# Patient Record
Sex: MALE | Race: WHITE | NOT HISPANIC OR LATINO
[De-identification: names, ages, dates, MRNs, and addresses within clinical notes are randomized per-mention and may not be internally consistent; named-entity substitution may affect disease eponyms.]

---

## 2022-06-30 PROBLEM — Z00.00 ENCOUNTER FOR PREVENTIVE HEALTH EXAMINATION: Status: ACTIVE | Noted: 2022-06-30

## 2022-07-07 ENCOUNTER — APPOINTMENT (OUTPATIENT)
Dept: ORTHOPEDIC SURGERY | Facility: CLINIC | Age: 27
End: 2022-07-07

## 2022-07-07 VITALS — WEIGHT: 170 LBS | HEIGHT: 69 IN | BODY MASS INDEX: 25.18 KG/M2

## 2022-07-07 DIAGNOSIS — S60.221A CONTUSION OF RIGHT HAND, INITIAL ENCOUNTER: ICD-10-CM

## 2022-07-07 PROCEDURE — 99203 OFFICE O/P NEW LOW 30 MIN: CPT | Mod: PA

## 2022-07-07 PROCEDURE — 99072 ADDL SUPL MATRL&STAF TM PHE: CPT

## 2022-07-07 NOTE — DATA REVIEWED
[FreeTextEntry1] :   An MRI of his right wrist brought in by the patient and scanned into the chart shows a contusion of the ulnar side a triquetrum and the 4th metacarpal.  Otherwise, there are no acute, displaced fractures or bony abnormalities.

## 2022-07-07 NOTE — DISCUSSION/SUMMARY
[de-identified] :   He will continue to ice and rest the area.\par I placed him in a cock-up wrist brace for immobilization and support.\par He understands these injuries can take 4-6 weeks to heal.  The 1st 2 weeks are typically the worst.\par He will continue to avoid any pushing, pulling, or heavy lifting.\par He will take over-the-counter anti-inflammatories for pain relief.\par He is temporarily moderately disabled and unable to return to work until his follow-up appointment.\par I will see him back in 4 weeks for repeat evaluation and hopefully return him to work full duty.\par All questions were answered today.

## 2022-07-07 NOTE — WORK
[Other: ___] : [unfilled] [Was the competent medical cause of the injury] : was the competent medical cause of the injury [Are consistent with the injury] : are consistent with the injury [Consistent with my objective findings] : consistent with my objective findings [Moderate Partial] : moderate partial [Does not reveal pre-existing condition(s) that may affect treatment/prognosis] : does not reveal pre-existing condition(s) that may affect treatment/prognosis [Cannot return to work because ________] : cannot return to work because [unfilled] [Patient] : patient [No Rx restrictions] : No Rx restrictions. [I provided the services listed above] :  I provided the services listed above. [FreeTextEntry1] :   Good [FreeTextEntry3] : TRAVIS

## 2022-07-07 NOTE — PHYSICAL EXAM
[de-identified] :   Physical exam of his right wrist and hand:  Negative swelling or ecchymosis.  Nontender over the distal radius or distal ulna.  Negative anatomical snuffbox tenderness.  He has pain over the triquetrum and 4th metacarpal base.  He can make a full fist.   strength is 5/5 with pain.  Sensory and motor are intact.

## 2022-08-04 ENCOUNTER — APPOINTMENT (OUTPATIENT)
Dept: ORTHOPEDIC SURGERY | Facility: CLINIC | Age: 27
End: 2022-08-04